# Patient Record
Sex: FEMALE | NOT HISPANIC OR LATINO
[De-identification: names, ages, dates, MRNs, and addresses within clinical notes are randomized per-mention and may not be internally consistent; named-entity substitution may affect disease eponyms.]

---

## 2020-08-17 PROBLEM — Z00.00 ENCOUNTER FOR PREVENTIVE HEALTH EXAMINATION: Status: ACTIVE | Noted: 2020-08-17

## 2020-08-20 ENCOUNTER — APPOINTMENT (OUTPATIENT)
Dept: ORTHOPEDIC SURGERY | Facility: CLINIC | Age: 85
End: 2020-08-20
Payer: MEDICARE

## 2020-08-20 VITALS
HEIGHT: 62 IN | SYSTOLIC BLOOD PRESSURE: 161 MMHG | WEIGHT: 140 LBS | HEART RATE: 85 BPM | BODY MASS INDEX: 25.76 KG/M2 | DIASTOLIC BLOOD PRESSURE: 75 MMHG

## 2020-08-20 DIAGNOSIS — Z78.9 OTHER SPECIFIED HEALTH STATUS: ICD-10-CM

## 2020-08-20 DIAGNOSIS — Z60.2 PROBLEMS RELATED TO LIVING ALONE: ICD-10-CM

## 2020-08-20 DIAGNOSIS — Z86.79 PERSONAL HISTORY OF OTHER DISEASES OF THE CIRCULATORY SYSTEM: ICD-10-CM

## 2020-08-20 DIAGNOSIS — Z87.39 PERSONAL HISTORY OF OTHER DISEASES OF THE MUSCULOSKELETAL SYSTEM AND CONNECTIVE TISSUE: ICD-10-CM

## 2020-08-20 PROCEDURE — 73030 X-RAY EXAM OF SHOULDER: CPT | Mod: LT

## 2020-08-20 PROCEDURE — 99203 OFFICE O/P NEW LOW 30 MIN: CPT

## 2020-08-20 RX ORDER — DIAZEPAM 5 MG/1
5 TABLET ORAL
Qty: 1 | Refills: 0 | Status: ACTIVE | COMMUNITY
Start: 2020-08-20 | End: 1900-01-01

## 2020-08-20 SDOH — SOCIAL STABILITY - SOCIAL INSECURITY: PROBLEMS RELATED TO LIVING ALONE: Z60.2

## 2020-08-21 ENCOUNTER — APPOINTMENT (OUTPATIENT)
Dept: MRI IMAGING | Facility: CLINIC | Age: 85
End: 2020-08-21
Payer: MEDICARE

## 2020-08-21 ENCOUNTER — OUTPATIENT (OUTPATIENT)
Dept: OUTPATIENT SERVICES | Facility: HOSPITAL | Age: 85
LOS: 1 days | End: 2020-08-21
Payer: MEDICARE

## 2020-08-21 ENCOUNTER — RESULT REVIEW (OUTPATIENT)
Age: 85
End: 2020-08-21

## 2020-08-21 DIAGNOSIS — Z00.8 ENCOUNTER FOR OTHER GENERAL EXAMINATION: ICD-10-CM

## 2020-08-21 PROCEDURE — 73221 MRI JOINT UPR EXTREM W/O DYE: CPT

## 2020-08-21 PROCEDURE — 73221 MRI JOINT UPR EXTREM W/O DYE: CPT | Mod: 26,LT

## 2020-08-24 PROBLEM — Z87.39 HISTORY OF ARTHRITIS: Status: RESOLVED | Noted: 2020-08-20 | Resolved: 2020-08-24

## 2020-08-24 PROBLEM — Z78.9 EXERCISES OCCASIONALLY: Status: ACTIVE | Noted: 2020-08-20

## 2020-08-24 PROBLEM — Z86.79 HISTORY OF HYPERTENSION: Status: RESOLVED | Noted: 2020-08-20 | Resolved: 2020-08-24

## 2020-08-24 PROBLEM — Z60.2 PERSON LIVING ALONE: Status: ACTIVE | Noted: 2020-08-20

## 2020-08-24 PROBLEM — Z78.9 DOES NOT USE ILLICIT DRUGS: Status: ACTIVE | Noted: 2020-08-20

## 2020-08-24 PROBLEM — Z86.79 HISTORY OF RETINAL VEIN OCCLUSION: Status: RESOLVED | Noted: 2020-08-20 | Resolved: 2020-08-24

## 2020-08-24 PROBLEM — Z78.9 CONSUMES ALCOHOL OCCASIONALLY: Status: ACTIVE | Noted: 2020-08-20

## 2020-08-24 PROBLEM — Z78.9 CURRENT NON-SMOKER: Status: ACTIVE | Noted: 2020-08-20

## 2020-08-24 RX ORDER — ESCITALOPRAM OXALATE 5 MG/1
TABLET, FILM COATED ORAL
Refills: 0 | Status: ACTIVE | COMMUNITY

## 2020-08-24 RX ORDER — AMLODIPINE BESYLATE 5 MG/1
TABLET ORAL
Refills: 0 | Status: ACTIVE | COMMUNITY

## 2020-08-24 RX ORDER — TRAVOPROST 0.04 MG/ML
0 SOLUTION OPHTHALMIC
Qty: 8 | Refills: 0 | Status: ACTIVE | COMMUNITY
Start: 2020-03-04

## 2020-08-24 RX ORDER — TRAMADOL HYDROCHLORIDE 50 MG/1
50 TABLET, COATED ORAL
Qty: 20 | Refills: 0 | Status: ACTIVE | COMMUNITY
Start: 2020-08-11

## 2020-08-24 RX ORDER — TRAVOPROST (BENZALKONIUM) 0.004 %
DROPS OPHTHALMIC (EYE)
Refills: 0 | Status: ACTIVE | COMMUNITY

## 2020-08-24 RX ORDER — ACETAMINOPHEN AND CODEINE 300; 30 MG/1; MG/1
300-30 TABLET ORAL
Qty: 40 | Refills: 0 | Status: ACTIVE | COMMUNITY
Start: 2020-07-24

## 2020-08-24 NOTE — HISTORY OF PRESENT ILLNESS
[de-identified] : 85 y/o RHD female presents with left shoulder pain due to a slip and fall about 3 weeks ago. She states that her shoulder dislocated and she was taking to [facility] and had the shoulder reduced. She was seen and advised to start PT. She has not started PT yet. She states the pain is minimal at rest and increases with certain motions. She denies any numbness or tingling. She denies any other dislocations.

## 2020-08-24 NOTE — REVIEW OF SYSTEMS
[Joint Pain] : joint pain [Joint Stiffness] : joint stiffness [Constipation] : constipation [Anxiety] : anxiety [Depression] : depression [Muscle Weakness] : muscle weakness [Sleep Disturbances] : ~T sleep disturbances [Feeling Weak] : feeling weak [Negative] : Heme/Lymph

## 2020-08-24 NOTE — CONSULT LETTER
[Dear  ___] : Dear ~JAMIE, [FreeTextEntry1] : I had the pleasure of evaluating your patient in the office today for complaints of left shoulder pain secondary to a recent fracture dislocation. She will be undergoing operative fixation and will require full medical clearance prior to the procedure. I have enclosed a copy of today's office notes for your charts and for your review.\par \par Sincerely, \par \par Alexis Mike M.D.\par Professor and \par Department of Orthopedic Surgery\par [facility] [place]\par

## 2020-08-24 NOTE — DISCUSSION/SUMMARY
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 A lengthy discussion was held regarding the patient’s condition and treatment options including all risks, benefits, prognosis and outcomes of each were discussed in detail. She had limited AROM of the shoulder due to likely associated rotator cuff pathology and we will obtain an MRI to further characterize this. To obtain optimal functional of the shoulder, she will likely require a rotator cuff repair in the near future. In regards to timing, will need time for the bony rotator cuff footprint to heal in order to support anchor fixation of the cuff. In regards to her axillary nerve neurapraxia, we will refer to Dr. Dwight Rosenstein prior to consideration of operative intervention to consider obtaining electrodiagnostics on her axillary nerve (i.e. EMG). The patient will contact me if there are any concerns otherwise I will call her with the results of her MRI in anticipation of surgical intervention with a rotator cuff repair. Germaine, our surgical coordinator, met with the patient today to preemptively set up a surgical date. She will additionally require full medical clearance prior to the procedure. The patient express understanding and all questions were answered.\par

## 2020-08-24 NOTE — PHYSICAL EXAM
[de-identified] : Pt is pleasant 85 yo female, AAOx3 with no apparent distress, 25 BMI.  Physical examination of both shoulders reveals normal contours with no deformity, skin intact, with no signs of infection, no erythema, no swelling, no discoloration, no distal lymphedema, no patholaxity, no muscle atrophy noted. ROM of left shoulder reveals forward flexion to 30° (passive 140),  external rotation -25°,  IR to L1. Motor strength 2/5 in ER, IR, and FF in the scapular plane. There is decreased sensation over the axillary nerve distribution. Motor intact distally to elbow/wrist/fingers 5/5.\par \par  [de-identified] : X-rays were ordered, obtained, and interpreted by me today: 4 views of the left shoulder demonstrate a reduced glenohumeral joint with a small greater tuberosity avulsion fracture that is displaced posterior superior. There is a poor bony contour of the greater tuberosity footprint consistent with avulsion fracture, with no other acute fractures or dislocation.\par \par A CT scan of the left shoulder was obtained 8/5/20 at [facility] in [place], [place] brought on disc and reviewed with patient showing multiple fracture fragments along the superior margin of the greater tuberosity. there is posterior humeral head subluxation of the humeral head. \par

## 2020-09-15 ENCOUNTER — APPOINTMENT (OUTPATIENT)
Dept: ORTHOPEDIC SURGERY | Facility: CLINIC | Age: 85
End: 2020-09-15
Payer: MEDICARE

## 2020-09-15 VITALS — TEMPERATURE: 97.6 F

## 2020-09-15 PROCEDURE — 73030 X-RAY EXAM OF SHOULDER: CPT | Mod: LT

## 2020-09-15 PROCEDURE — 99213 OFFICE O/P EST LOW 20 MIN: CPT

## 2020-09-15 NOTE — REVIEW OF SYSTEMS
[Joint Pain] : joint pain [Joint Stiffness] : joint stiffness [Depression] : depression [Anxiety] : anxiety [Constipation] : constipation [Sleep Disturbances] : ~T sleep disturbances [Feeling Weak] : feeling weak [Muscle Weakness] : muscle weakness [Negative] : Heme/Lymph

## 2020-09-16 NOTE — PHYSICAL EXAM
[de-identified] : Pt is pleasant 85 yo female, AAOx3 with no apparent distress, 25 BMI.  Physical examination of both shoulders reveals normal contours with no deformity, skin intact, with no signs of infection, no erythema, no swelling, no discoloration, no distal lymphedema, no patholaxity, no muscle atrophy noted. ROM of left shoulder reveals forward flexion to 30° (passive 140),  external rotation -10°,  IR to T12. Motor strength 2/5 in ER, IR, and FF in the scapular plane. There is decreased sensation over the axillary nerve distribution. Motor intact distally to elbow/wrist/fingers 5/5.\par \par  [de-identified] : X-rays were ordered, obtained, and interpreted by me today (9/15/20): 4 views of the left shoulder demonstrate a reduced glenohumeral joint with a small greater tuberosity avulsion fracture that is displaced posterior superior. There is a poor bony contour of the greater tuberosity footprint consistent with avulsion fracture,, with no acute fracture or dislocation.\par \par MR L Shoulder ([facility] [place], 8/21/20): complete tears of the supraspinatus and infraspinatus with retraction to superior medial humeral head, severe subscapularis tendinosis, severe intra-articular biceps tendinosis, moderate to high-grade sprain of IGHL, fracture of greater tuberosity, mild glenohumeral arthrosis, large effusion and synovitis.\par \par Prior X-Rays were reviewed: 4 views of the left shoulder demonstrate a reduced glenohumeral joint with a small greater tuberosity avulsion fracture that is displaced posterior superior. There is a poor bony contour of the greater tuberosity footprint consistent with avulsion fracture, with no other acute fractures or dislocation.\par \par A CT scan of the left shoulder was obtained 8/5/20 at [facility] in [place], [place] brought on disc and reviewed with patient showing multiple fracture fragments along the superior margin of the greater tuberosity. there is posterior humeral head subluxation of the humeral head. \par

## 2020-09-16 NOTE — DISCUSSION/SUMMARY
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 A lengthy discussion was held regarding the patient’s condition and treatment options including all risks, benefits, prognosis and outcomes of each were discussed in detail. She had limited AROM of the shoulder due to likely associated rotator cuff pathology. MRI results confirmed a full-thickness rotator cuff tear in association with greater tuberosity avulsion fracture. Bony rotator cuff footprint is healing appropriately as confirmed on repeat x-rays today, which allow for anchor fixation of the cuff. Discussed complexity of her case due to poor bone quality and possible difficulty with anchor fixation. A rotator cuff repair will be tentatively scheduled for 10/8/20. Patient was fitted for her post-operative shoulder immobilizer in the office. The patient will contact me if there are any concerns. She will additionally require full medical clearance prior to the procedure. The patient express understanding and all questions were answered.\par \par

## 2020-09-16 NOTE — CONSULT LETTER
[Dear  ___] : Dear  [unfilled], [FreeTextEntry1] : I had the pleasure of evaluating your patient in the office today for complaints of left shoulder pain secondary to a recent fracture dislocation. She will be undergoing operative fixation and will require full medical clearance prior to the procedure on 10/8/20. I have enclosed a copy of today's office notes for your charts and for your review.\par \par Sincerely, \par \par Alexis Mike M.D.\par Professor and \[facility] Department of Orthopedic Surgery\par  [facility] [place]\par

## 2020-09-16 NOTE — HISTORY OF PRESENT ILLNESS
[de-identified] : 86 y/o RHD female presents with left shoulder pain and was seen in the past for left shoulder pain and was sent to obtain an MRI. Date of injury was on 7/23/20. She is here today to discuss the results. She states that her symptoms are about the same and has 5/10 at this visit. The pain is keeping her up at night. She is taking Tylenol for the pain. She has been doing PT on her own. She has no new complaints. \par \par

## 2020-10-01 ENCOUNTER — OUTPATIENT (OUTPATIENT)
Dept: OUTPATIENT SERVICES | Facility: HOSPITAL | Age: 85
LOS: 1 days | End: 2020-10-01
Payer: MEDICARE

## 2020-10-01 VITALS
HEART RATE: 78 BPM | TEMPERATURE: 98 F | WEIGHT: 143.08 LBS | OXYGEN SATURATION: 98 % | RESPIRATION RATE: 16 BRPM | SYSTOLIC BLOOD PRESSURE: 136 MMHG | DIASTOLIC BLOOD PRESSURE: 78 MMHG | HEIGHT: 61.5 IN

## 2020-10-01 DIAGNOSIS — Z96.659 PRESENCE OF UNSPECIFIED ARTIFICIAL KNEE JOINT: Chronic | ICD-10-CM

## 2020-10-01 DIAGNOSIS — Z01.818 ENCOUNTER FOR OTHER PREPROCEDURAL EXAMINATION: ICD-10-CM

## 2020-10-01 DIAGNOSIS — I10 ESSENTIAL (PRIMARY) HYPERTENSION: ICD-10-CM

## 2020-10-01 DIAGNOSIS — M75.100 UNSPECIFIED ROTATOR CUFF TEAR OR RUPTURE OF UNSPECIFIED SHOULDER, NOT SPECIFIED AS TRAUMATIC: ICD-10-CM

## 2020-10-01 DIAGNOSIS — M75.120 COMPLETE ROTATOR CUFF TEAR OR RUPTURE OF UNSPECIFIED SHOULDER, NOT SPECIFIED AS TRAUMATIC: ICD-10-CM

## 2020-10-01 LAB
ANION GAP SERPL CALC-SCNC: 15 MMOL/L — SIGNIFICANT CHANGE UP (ref 5–17)
BUN SERPL-MCNC: 23 MG/DL — SIGNIFICANT CHANGE UP (ref 7–23)
CALCIUM SERPL-MCNC: 9.5 MG/DL — SIGNIFICANT CHANGE UP (ref 8.4–10.5)
CHLORIDE SERPL-SCNC: 103 MMOL/L — SIGNIFICANT CHANGE UP (ref 96–108)
CO2 SERPL-SCNC: 24 MMOL/L — SIGNIFICANT CHANGE UP (ref 22–31)
CREAT SERPL-MCNC: 0.81 MG/DL — SIGNIFICANT CHANGE UP (ref 0.5–1.3)
GLUCOSE SERPL-MCNC: 93 MG/DL — SIGNIFICANT CHANGE UP (ref 70–99)
HCT VFR BLD CALC: 39.4 % — SIGNIFICANT CHANGE UP (ref 34.5–45)
HGB BLD-MCNC: 12.4 G/DL — SIGNIFICANT CHANGE UP (ref 11.5–15.5)
MCHC RBC-ENTMCNC: 30 PG — SIGNIFICANT CHANGE UP (ref 27–34)
MCHC RBC-ENTMCNC: 31.5 GM/DL — LOW (ref 32–36)
MCV RBC AUTO: 95.2 FL — SIGNIFICANT CHANGE UP (ref 80–100)
NRBC # BLD: 0 /100 WBCS — SIGNIFICANT CHANGE UP (ref 0–0)
PLATELET # BLD AUTO: 328 K/UL — SIGNIFICANT CHANGE UP (ref 150–400)
POTASSIUM SERPL-MCNC: 4.6 MMOL/L — SIGNIFICANT CHANGE UP (ref 3.5–5.3)
POTASSIUM SERPL-SCNC: 4.6 MMOL/L — SIGNIFICANT CHANGE UP (ref 3.5–5.3)
RBC # BLD: 4.14 M/UL — SIGNIFICANT CHANGE UP (ref 3.8–5.2)
RBC # FLD: 13.9 % — SIGNIFICANT CHANGE UP (ref 10.3–14.5)
SODIUM SERPL-SCNC: 142 MMOL/L — SIGNIFICANT CHANGE UP (ref 135–145)
WBC # BLD: 10.03 K/UL — SIGNIFICANT CHANGE UP (ref 3.8–10.5)
WBC # FLD AUTO: 10.03 K/UL — SIGNIFICANT CHANGE UP (ref 3.8–10.5)

## 2020-10-01 PROCEDURE — 80048 BASIC METABOLIC PNL TOTAL CA: CPT

## 2020-10-01 PROCEDURE — G0463: CPT

## 2020-10-01 PROCEDURE — 85027 COMPLETE CBC AUTOMATED: CPT

## 2020-10-01 RX ORDER — CHLORHEXIDINE GLUCONATE 213 G/1000ML
1 SOLUTION TOPICAL ONCE
Refills: 0 | Status: DISCONTINUED | OUTPATIENT
Start: 2020-10-08 | End: 2020-10-22

## 2020-10-01 RX ORDER — SODIUM CHLORIDE 9 MG/ML
3 INJECTION INTRAMUSCULAR; INTRAVENOUS; SUBCUTANEOUS EVERY 8 HOURS
Refills: 0 | Status: DISCONTINUED | OUTPATIENT
Start: 2020-10-08 | End: 2020-10-22

## 2020-10-01 RX ORDER — LIDOCAINE HCL 20 MG/ML
0.2 VIAL (ML) INJECTION ONCE
Refills: 0 | Status: DISCONTINUED | OUTPATIENT
Start: 2020-10-08 | End: 2020-10-22

## 2020-10-01 RX ORDER — CEFAZOLIN SODIUM 1 G
2000 VIAL (EA) INJECTION ONCE
Refills: 0 | Status: DISCONTINUED | OUTPATIENT
Start: 2020-10-08 | End: 2020-10-22

## 2020-10-01 NOTE — H&P PST ADULT - MUSCULOSKELETAL
details… detailed exam no joint warmth/no calf tenderness/decreased ROM due to pain/no joint swelling/no joint erythema/left shoulder

## 2020-10-01 NOTE — H&P PST ADULT - NSICDXPROBLEM_GEN_ALL_CORE_FT
PROBLEM DIAGNOSES  Problem: Rotator cuff tear  Assessment and Plan:  scheduled left shoulder arthroscopic rotator cuff repair & subacromial decompression on 10/08/20.      Problem: HTN (hypertension)  Assessment and Plan: Instructed to continue meds &  take with sips of water in AM the day of surgery

## 2020-10-01 NOTE — H&P PST ADULT - NSICDXPASTMEDICALHX_GEN_ALL_CORE_FT
PAST MEDICAL HISTORY:  Glaucoma     HTN (hypertension)     Inflammation of eye, left retinal vein occlusion, macular edema  /p injection every 3 months     PAST MEDICAL HISTORY:  Complete tear of left rotator cuff     Glaucoma     HTN (hypertension)     Inflammation of eye, left brach of left retinal vein occlusion, macular edema  s/p injection   due every 3 months

## 2020-10-01 NOTE — H&P PST ADULT - HISTORY OF PRESENT ILLNESS
85 year old female with 85 year old female retired  reports having tripped & fell in   july, 2020 causing complete rotator cuff tear left shoulder, presents for scheduled left shoulder arthroscopic rotator cuff repair & subacromial decompression on 10/08/20.    ****Preop covid testing 10/05/20 at [facility]

## 2020-10-07 ENCOUNTER — TRANSCRIPTION ENCOUNTER (OUTPATIENT)
Age: 85
End: 2020-10-07

## 2020-10-08 ENCOUNTER — OUTPATIENT (OUTPATIENT)
Dept: OUTPATIENT SERVICES | Facility: HOSPITAL | Age: 85
LOS: 1 days | End: 2020-10-08
Payer: MEDICARE

## 2020-10-08 ENCOUNTER — APPOINTMENT (OUTPATIENT)
Dept: ORTHOPEDIC SURGERY | Facility: HOSPITAL | Age: 85
End: 2020-10-08

## 2020-10-08 VITALS
WEIGHT: 143.08 LBS | RESPIRATION RATE: 16 BRPM | HEART RATE: 89 BPM | HEIGHT: 61.5 IN | TEMPERATURE: 97 F | OXYGEN SATURATION: 99 % | SYSTOLIC BLOOD PRESSURE: 130 MMHG | DIASTOLIC BLOOD PRESSURE: 74 MMHG

## 2020-10-08 VITALS
RESPIRATION RATE: 18 BRPM | SYSTOLIC BLOOD PRESSURE: 149 MMHG | HEART RATE: 76 BPM | DIASTOLIC BLOOD PRESSURE: 82 MMHG | OXYGEN SATURATION: 100 %

## 2020-10-08 DIAGNOSIS — Z01.818 ENCOUNTER FOR OTHER PREPROCEDURAL EXAMINATION: ICD-10-CM

## 2020-10-08 DIAGNOSIS — Z96.659 PRESENCE OF UNSPECIFIED ARTIFICIAL KNEE JOINT: Chronic | ICD-10-CM

## 2020-10-08 DIAGNOSIS — M75.120 COMPLETE ROTATOR CUFF TEAR OR RUPTURE OF UNSPECIFIED SHOULDER, NOT SPECIFIED AS TRAUMATIC: ICD-10-CM

## 2020-10-08 PROCEDURE — 29827 SHO ARTHRS SRG RT8TR CUF RPR: CPT | Mod: LT

## 2020-10-08 PROCEDURE — C1713: CPT

## 2020-10-08 PROCEDURE — 29826 SHO ARTHRS SRG DECOMPRESSION: CPT | Mod: LT

## 2020-10-08 RX ORDER — TRAVOPROST 0.04 MG/ML
1 SOLUTION/ DROPS OPHTHALMIC
Qty: 0 | Refills: 0 | DISCHARGE

## 2020-10-08 RX ORDER — ACETAMINOPHEN 500 MG
1000 TABLET ORAL ONCE
Refills: 0 | Status: DISCONTINUED | OUTPATIENT
Start: 2020-10-08 | End: 2020-10-22

## 2020-10-08 RX ORDER — AMLODIPINE BESYLATE 2.5 MG/1
1 TABLET ORAL
Qty: 0 | Refills: 0 | DISCHARGE

## 2020-10-08 RX ORDER — ONDANSETRON 8 MG/1
4 TABLET, FILM COATED ORAL ONCE
Refills: 0 | Status: DISCONTINUED | OUTPATIENT
Start: 2020-10-08 | End: 2020-10-22

## 2020-10-08 NOTE — ASU DISCHARGE PLAN (ADULT/PEDIATRIC) - ASU DC SPECIAL INSTRUCTIONSFT
Follow up with Dr Mike in 7-14 days. Call office for appointment. Take medications as prescribed. Keep dressing clean, dry, and intact. Rest, ice, and elevate affected extremity. Non-weightbearing left upper extremity in shoulder immobilizer. Further instructions detailed in packet.

## 2020-10-08 NOTE — ASU PREOP CHECKLIST - ANTIBIOTIC
From: Ej Montemayor  To: Giovanni Severe, MD  Sent: 4/20/2020 10:55 AM CDT  Subject: Visit Follow-up Question    Hello! On Friday I went to the ER and have acute Colitis. I am on antibiotics and getting better. This is the 2nd time I have had colitis.  Is t n/a

## 2020-10-08 NOTE — ASU DISCHARGE PLAN (ADULT/PEDIATRIC) - CALL YOUR DOCTOR IF YOU HAVE ANY OF THE FOLLOWING:
Wound/Surgical Site with redness, or foul smelling discharge or pus/Nausea and vomiting that does not stop/Pain not relieved by Medications/Fever greater than (need to indicate Fahrenheit or Celsius)/Bleeding that does not stop/Swelling that gets worse/Numbness, tingling, color or temperature change to extremity

## 2020-10-08 NOTE — ASU PATIENT PROFILE, ADULT - PMH
Complete tear of left rotator cuff    Glaucoma    HTN (hypertension)    Inflammation of eye, left  brach of left retinal vein occlusion, macular edema  s/p injection   due every 3 months

## 2020-10-08 NOTE — ASU DISCHARGE PLAN (ADULT/PEDIATRIC) - CARE PROVIDER_API CALL
Alexis Mike)  Orthopaedic Surgery  [address]  [place] [address]  Phone: [phone]  Fax: [phone]  Follow Up Time:

## 2020-10-20 PROBLEM — H57.89 OTHER SPECIFIED DISORDERS OF EYE AND ADNEXA: Chronic | Status: ACTIVE | Noted: 2020-10-01

## 2020-10-20 PROBLEM — M75.122 COMPLETE ROTATOR CUFF TEAR OR RUPTURE OF LEFT SHOULDER, NOT SPECIFIED AS TRAUMATIC: Chronic | Status: ACTIVE | Noted: 2020-10-01

## 2020-10-20 PROBLEM — H40.9 UNSPECIFIED GLAUCOMA: Chronic | Status: ACTIVE | Noted: 2020-10-01

## 2020-10-20 PROBLEM — I10 ESSENTIAL (PRIMARY) HYPERTENSION: Chronic | Status: ACTIVE | Noted: 2020-10-01

## 2020-10-23 ENCOUNTER — APPOINTMENT (OUTPATIENT)
Dept: ORTHOPEDIC SURGERY | Facility: CLINIC | Age: 85
End: 2020-10-23
Payer: MEDICARE

## 2020-10-23 VITALS — TEMPERATURE: 97.3 F

## 2020-10-23 PROCEDURE — 73030 X-RAY EXAM OF SHOULDER: CPT | Mod: LT

## 2020-10-23 PROCEDURE — 99024 POSTOP FOLLOW-UP VISIT: CPT

## 2020-10-23 NOTE — REVIEW OF SYSTEMS
[Joint Pain] : joint pain [Joint Stiffness] : joint stiffness [Constipation] : constipation [Anxiety] : anxiety [Depression] : depression [Sleep Disturbances] : ~T sleep disturbances [Feeling Weak] : feeling weak [Muscle Weakness] : muscle weakness [Negative] : Heme/Lymph

## 2020-10-27 NOTE — CONSULT LETTER
[Dear  ___] : Dear  [unfilled], [FreeTextEntry1] : I had the pleasure of evaluating your patient in the office today for routine post-operative visit s/p 10/8/2020 Left shoulder arthroscopic RTCR and SAD and biceps tenotomy. I have enclosed a copy of today's office notes for your charts and for your review.\par \par Sincerely, \par \par Alexis Mike M.D.\par Professor and \par Department of Orthopedic Surgery\par [facility] [place]\par \par

## 2020-10-27 NOTE — DISCUSSION/SUMMARY
[de-identified] : 84 y/o female s/p 10/8/2020 Left shoulder arthroscopic RTCR and SAD and biceps tenotomy. The arthroscopic images were reviewed with the patient. Patient to remain in sling for 8 weeks from date of surgery. Will start physical therapy around 8 weeks from the date of surgery. The patient will contact me if she has any concerns. Follow up in 3.5 weeks. Will repeat X-rays at next appointment. Pt express understanding and all questions were answered.\par

## 2020-10-27 NOTE — PHYSICAL EXAM
[de-identified] : Pt is pleasant 84 yo female, AAOx3 with no apparent distress, 25 BMI.  Physical examination of both shoulders reveals normal contours with no deformity, well-healing surgical incision, with no signs of infection, no erythema, no swelling, no discoloration, no distal lymphedema, no patholaxity, no muscle atrophy noted. ROM/Strength testing deferred. No neurological deficits noted.\par \par  [de-identified] : X-rays were ordered, obtained, and interpreted by me today (10/23/20): 4 views of the left shoulder demonstrate s/p RTC repair, with no acute fracture or dislocation.\par \par Prior X-Rays (9/15/20): 4 views of the left shoulder demonstrate a reduced glenohumeral joint with a small greater tuberosity avulsion fracture that is displaced posterior superior. There is a poor bony contour of the greater tuberosity footprint consistent with avulsion fracture,, with no acute fracture or dislocation.\par \par MR L Shoulder ([facility] [place], 8/21/20): complete tears of the supraspinatus and infraspinatus with retraction to superior medial humeral head, severe subscapularis tendinosis, severe intra-articular biceps tendinosis, moderate to high-grade sprain of IGHL, fracture of greater tuberosity, mild glenohumeral arthrosis, large effusion and synovitis.\par \par Prior X-Rays were reviewed: 4 views of the left shoulder demonstrate a reduced glenohumeral joint with a small greater tuberosity avulsion fracture that is displaced posterior superior. There is a poor bony contour of the greater tuberosity footprint consistent with avulsion fracture, with no other acute fractures or dislocation.\par \par A CT scan of the left shoulder was obtained 8/5/20 at [facility] in [place], [place] brought on disc and reviewed with patient showing multiple fracture fragments along the superior margin of the greater tuberosity. there is posterior humeral head subluxation of the humeral head. \par

## 2020-10-27 NOTE — HISTORY OF PRESENT ILLNESS
[de-identified] : 84 y/o RHD female s/p 10/8/2020 Left shoulder arthroscopic RTCR and SAD and biceps tenotomy is overall slowly improving. She still has pain and discomfort but has stopped taking pain meds. She is compliant in the brace. She has no other complaints.

## 2020-10-27 NOTE — REASON FOR VISIT
[Post Operative Visit] : a post operative visit for [Aftercare Following Surgery] : aftercare following surgery [FreeTextEntry2] : POSTOP s/p 10/8/2020 Left shoulder arthroscopic RTCR and SAD and biceps tenotomy

## 2020-11-17 ENCOUNTER — APPOINTMENT (OUTPATIENT)
Dept: ORTHOPEDIC SURGERY | Facility: CLINIC | Age: 85
End: 2020-11-17
Payer: MEDICARE

## 2020-11-17 VITALS — TEMPERATURE: 97.3 F

## 2020-11-17 DIAGNOSIS — Z98.890 OTHER SPECIFIED POSTPROCEDURAL STATES: ICD-10-CM

## 2020-11-17 PROCEDURE — 99024 POSTOP FOLLOW-UP VISIT: CPT

## 2020-11-17 PROCEDURE — 73030 X-RAY EXAM OF SHOULDER: CPT | Mod: LT

## 2020-11-18 NOTE — PHYSICAL EXAM
[de-identified] : Pt is pleasant 84 yo female, AAOx3 with no apparent distress, 25 BMI. Physical examination of both shoulders reveals normal contours with no deformity, well-healing surgical incision, with no signs of infection, no erythema, no swelling, no discoloration, no distal lymphedema, no patholaxity, no muscle atrophy noted. ROM/Strength testing deferred. No neurological deficits noted.\par \par  [de-identified] : X-rays were ordered, obtained, and interpreted by me today (11/17/20): 4 views of the left shoulder with normal post surgical changes, healing fracture, appropriate post surgical changes, with no acute fracture or dislocation.\par \par Prior X-Rays (9/15/20): 4 views of the left shoulder demonstrate a reduced glenohumeral joint with a small greater tuberosity avulsion fracture that is displaced posterior superior. There is a poor bony contour of the greater tuberosity footprint consistent with avulsion fracture,, with no acute fracture or dislocation.\par \par MR L Shoulder ([facility] [place], 8/21/20): complete tears of the supraspinatus and infraspinatus with retraction to superior medial humeral head, severe subscapularis tendinosis, severe intra-articular biceps tendinosis, moderate to high-grade sprain of IGHL, fracture of greater tuberosity, mild glenohumeral arthrosis, large effusion and synovitis.\par \par Prior X-Rays were reviewed: 4 views of the left shoulder demonstrate a reduced glenohumeral joint with a small greater tuberosity avulsion fracture that is displaced posterior superior. There is a poor bony contour of the greater tuberosity footprint consistent with avulsion fracture, with no other acute fractures or dislocation.\par \par A CT scan of the left shoulder was obtained 8/5/20 at [facility] in [place], [place] brought on disc and reviewed with patient showing multiple fracture fragments along the superior margin of the greater tuberosity. there is posterior humeral head subluxation of the humeral head. \par \par \par \par

## 2020-11-18 NOTE — DISCUSSION/SUMMARY
[de-identified] : 84 y/o female s/p Left shoulder arthroscopic RTCR and SAD and biceps tenotomy on 10/8/2020. Patient is overall progressing well. Discussed with patient, ok to wear sling as needed, may take off when at rest. Ok to starting driving. Will completely discontinue sling use at next office visit. Will start physical therapy, prescription was given to the patient. The patient will contact me if she has any concerns. Follow up in 4 weeks.  Pt express understanding and all questions were answered.\par \par

## 2020-11-18 NOTE — CONSULT LETTER
[Dear  ___] : Dear  [unfilled], [FreeTextEntry1] : I had the pleasure of evaluating your patient in the office today for routine post-operative visit s/p 10/8/2020 Left shoulder arthroscopic RTCR and SAD and biceps tenotomy. I have enclosed a copy of today's office notes for your charts and for your review.\par \par Sincerely, \par \par Alexis Mike M.D.\par Professor and \par Department of Orthopedic Surgery\par [facility] [place]\par \par

## 2020-11-18 NOTE — HISTORY OF PRESENT ILLNESS
[de-identified] : 86 y/o RHD female s/p 10/8/2020 Left shoulder arthroscopic RTCR and SAD and biceps tenotomy is overall slowly improving. She is compliant in the brace. She has no other complaints.

## 2020-12-15 ENCOUNTER — APPOINTMENT (OUTPATIENT)
Dept: ORTHOPEDIC SURGERY | Facility: CLINIC | Age: 85
End: 2020-12-15
Payer: MEDICARE

## 2020-12-15 VITALS — TEMPERATURE: 96.3 F

## 2020-12-15 DIAGNOSIS — S46.012A STRAIN OF MUSCLE(S) AND TENDON(S) OF THE ROTATOR CUFF OF LEFT SHOULDER, INITIAL ENCOUNTER: ICD-10-CM

## 2020-12-15 PROCEDURE — 73030 X-RAY EXAM OF SHOULDER: CPT | Mod: LT

## 2020-12-15 PROCEDURE — 99024 POSTOP FOLLOW-UP VISIT: CPT

## 2020-12-17 PROBLEM — S46.012A TRAUMATIC COMPLETE TEAR OF LEFT ROTATOR CUFF, INITIAL ENCOUNTER: Status: ACTIVE | Noted: 2020-08-20

## 2020-12-18 NOTE — HISTORY OF PRESENT ILLNESS
[de-identified] : 86 y/o RHD female s/p 10/8/2020 Left shoulder arthroscopic RTCR and SAD and biceps tenotomy is overall slowly improving. She is going to PT at [facility].  She has no other complaints.

## 2020-12-18 NOTE — DISCUSSION/SUMMARY
[de-identified] : 86 y/o female s/p Left shoulder arthroscopic RTCR and SAD and biceps tenotomy on 10/8/2020. Patient is overall progressing well. Discussed with patient, ok to wear sling as needed, however she may discontinue its use as of now. Based on her clinical exam, recommended against driving at this time, until January, when she may potentially having more range of motion to her left shoulder. She is to continue with PT,  A new PT script was provided to her, she is to begin AROM/AAROM and strengthening to follow slowly in January. The patient will contact me if she has any concerns. Follow up in 6 weeks.  Pt express understanding and all questions were answered.\par

## 2020-12-18 NOTE — PHYSICAL EXAM
[de-identified] : Pt is pleasant 86 yo female, AAOx3 with no apparent distress, 25 BMI. Physical examination of both shoulders reveals normal contours with no deformity, well-healing surgical incision, with no signs of infection, no erythema, no swelling, no discoloration, no distal lymphedema, no patholaxity, no muscle atrophy noted. ROM/Strength testing deferred. No neurological deficits noted. FF 90 degrees, ER 25 degrees and IR to T12, in the scapular plane. \par \par \par  [de-identified] : X-rays were ordered, obtained, and interpreted by me today: 4 views of the Left shoulder demonstrate normal post surgical changes, mild OA changes, with no acute fracture or dislocation. \par \par \par \par

## 2020-12-18 NOTE — CONSULT LETTER
[Dear  ___] : Dear  [unfilled], [FreeTextEntry1] : I had the pleasure of evaluating your patient in the office today for routine post-operative visit s/p 10/8/2020 Left shoulder arthroscopic RTCR and SAD and biceps tenotomy. I have enclosed a copy of today's office notes for your charts and for your review.\par \par Sincerely, \par \par Alexis Mike M.D.\par Professor and \par Department of Orthopedic Surgery\par [facility] [place]\par \par

## 2021-01-26 ENCOUNTER — APPOINTMENT (OUTPATIENT)
Dept: ORTHOPEDIC SURGERY | Facility: CLINIC | Age: 86
End: 2021-01-26
Payer: MEDICARE

## 2021-01-26 DIAGNOSIS — M75.120 COMPLETE ROTATOR CUFF TEAR OR RUPTURE OF UNSPECIFIED SHOULDER, NOT SPECIFIED AS TRAUMATIC: ICD-10-CM

## 2021-01-26 PROCEDURE — 99213 OFFICE O/P EST LOW 20 MIN: CPT

## 2021-01-26 NOTE — HISTORY OF PRESENT ILLNESS
[de-identified] : 86 y/o RHD female s/p 10/8/2020 Left shoulder arthroscopic RTCR and SAD and biceps tenotomy is overall slowly improving. She is going to PT at [facility].  She has no other complaints.

## 2021-01-26 NOTE — PHYSICAL EXAM
[de-identified] : X-rays were ordered, obtained, and interpreted by me today: 4 views of the Left shoulder demonstrate normal post surgical changes, mild OA changes, with no acute fracture or dislocation. \par \par \par \par  [UE] : Sensory: Intact in bilateral upper extremities [Rad] : radial 2+ and symmetric bilaterally [de-identified] : Pt is pleasant 84 yo female, AAOx3 with no apparent distress, 25 BMI. Physical examination of both shoulders reveals normal contours with no deformity, well-healed surgical incision, with no signs of infection, no erythema, no swelling, no discoloration, no distal lymphedema, no patholaxity, no muscle atrophy noted. ROM/Strength 4/5 ER and FF. No neurological deficits noted.  degrees, ER 40 degrees and IR symmetric to T10, in the scapular plane. \par \par

## 2021-01-26 NOTE — DISCUSSION/SUMMARY
[de-identified] : 86 y/o female s/p Left shoulder arthroscopic RTCR and SAD and biceps tenotomy on 10/8/2020. Patient has shown significant improvement since her last office visit especially in her ROM and strength of her left shoulder. We discussed that at this time, the patient can begin to resume full activity while still remaining cautious of specific activities that may compromise her repair. We discussed that she should abstain from golf until 6 months after her surgery.  A new physical therapy script was provided in the office today to help to continue to increase her ROM and strength. The patient does not need to wear her sling any longer and may begin to drive. We also discussed fragility fractures and the importance of following up with an endocrinologist (Dr. Wilbur Hancock) to obtain a DEXA scan and to review her Vitamin D and Calcium levels. The patient will contact me if she has any concerns. Follow up in 8 weeks.  Pt express understanding and all questions were answered.\par \par

## 2021-01-26 NOTE — REASON FOR VISIT
[Follow-Up Visit] : a follow-up visit for [Aftercare Following Surgery] : aftercare following surgery [FreeTextEntry2] : s/p 10/8/2020 Left shoulder arthroscopic RTCR and SAD and biceps tenotomy

## 2021-01-26 NOTE — CONSULT LETTER
[Dear  ___] : Dear  [unfilled], [FreeTextEntry1] : I had the pleasure of evaluating your patient in the office today for routine post-operative visit s/p 10/8/2020 Left shoulder arthroscopic RTCR and SAD and biceps tenotomy. I have enclosed a copy of today's office notes for your charts and for your review.\par \par Sincerely, \par \par Alexis Mike M.D.\par Professor and \par Department of Orthopedic Surgery\par [facility] [place]\par \par

## 2021-03-26 ENCOUNTER — APPOINTMENT (OUTPATIENT)
Dept: ORTHOPEDIC SURGERY | Facility: CLINIC | Age: 86
End: 2021-03-26
Payer: MEDICARE

## 2021-03-26 DIAGNOSIS — M75.51 BURSITIS OF RIGHT SHOULDER: ICD-10-CM

## 2021-03-26 PROCEDURE — 99213 OFFICE O/P EST LOW 20 MIN: CPT | Mod: 25

## 2021-03-26 PROCEDURE — 73030 X-RAY EXAM OF SHOULDER: CPT | Mod: RT

## 2021-03-26 PROCEDURE — 20610 DRAIN/INJ JOINT/BURSA W/O US: CPT | Mod: RT

## 2021-03-26 NOTE — CONSULT LETTER
[Dear  ___] : Dear  [unfilled], [FreeTextEntry1] : I had the pleasure of evaluating your patient in the office today for routine post-operative visit s/p 10/8/2020 Left shoulder arthroscopic RTCR and SAD and biceps tenotomy and right shoulder bursitis/tendinitis. I have enclosed a copy of today's office notes for your charts and for your review.\par \[facility] Sincerely, \par \par Alexis Mike M.D.\par Professor and \[facility] Department of Orthopedic Surgery\par [facility] [place]\par \par

## 2021-03-26 NOTE — PROCEDURE
[de-identified] : After careful discussion regarding the risks versus benefits of a corticosteroid injection the patient has elected to proceed with that treatment. Under sterile conditions, the patient received  a right shoulder injection into the glenohumeral space, bursal sided with 8 cc of half percent Marcaine without epinephrine and 2 cc of Betamethasone. The site was cleaned and a bandaid was applied. The patient tolerated the injection without problem.\par \par

## 2021-03-26 NOTE — DISCUSSION/SUMMARY
[de-identified] : 84 y/o female with right shoulder pain secondary to bursitis/tendinitis that was aggravated by exercise. A lengthy discussion was held regarding the patient’s condition and treatment options including all risks, benefits, prognosis and outcomes of each were discussed in detail. The patient would like to continue with conservative management at this time and was advised on the use of NSAIDS for pain control, icing, activity modification, and possible cortisone injections. The patient received a right shoulder corticosteroid injection today. For now, we will try to normalize the pain of the right shoulder and then we will discuss continuing PT for the left shoulder in about a month. The patient will contact me if there are any concerns otherwise follow up will be in 4 months. The patient express understanding and all questions were answered.\par

## 2021-03-26 NOTE — PHYSICAL EXAM
[de-identified] : Pt is pleasant 86 yo female, AAOx3 with no apparent distress, 25.6 BMI.  Physical examination of both shoulders reveals normal contours with no deformity, skin intact, with no signs of infection, no erythema, no swelling, no discoloration, no distal lymphedema, no patholaxity, no muscle atrophy noted. ROM of right shoulder reveals forward flexion to 140°,  external rotation 50°,  IR to T10. Motor strength 5/5 in ER, IR, and FF in the scapular plane. No neurological deficits noted.\par \par ROM of left shoulder FF to 135 deg, 35 ER, IR to T9, 5/5 in ER, IR and FF.\par  [de-identified] : X-rays were ordered, obtained, and interpreted by me today: 4 views of the right shoulder (3/26/21, [facility]) demonstrate no osseous abnormalities, with no acute fracture or dislocation.\par

## 2021-03-26 NOTE — HISTORY OF PRESENT ILLNESS
[de-identified] : 86 y/o RHD female s/p 10/8/2020 Left shoulder arthroscopic RTCR and SAD presents with right shoulder pain since 2/2021 after finishing therapy for her left shoulder. She denies any injury or trauma. She has full ROM but in certain positions she does have pain. She has no pain at rest. The pain does not keep her up at night. She denies any numbness or tingling.

## 2024-01-28 NOTE — H&P PST ADULT - REASON FOR ADMISSION
left shoulder rotator cuff DC instructions "I tripped on a visiting dog anf fell causing got complete rotator cuff tear on my left shoulder"
